# Patient Record
Sex: MALE | Race: BLACK OR AFRICAN AMERICAN | NOT HISPANIC OR LATINO | URBAN - METROPOLITAN AREA
[De-identification: names, ages, dates, MRNs, and addresses within clinical notes are randomized per-mention and may not be internally consistent; named-entity substitution may affect disease eponyms.]

---

## 2020-06-15 ENCOUNTER — OFFICE VISIT (OUTPATIENT)
Dept: URBAN - METROPOLITAN AREA CLINIC 113 | Facility: CLINIC | Age: 75
End: 2020-06-15

## 2020-06-15 ENCOUNTER — OFFICE VISIT (OUTPATIENT)
Dept: URBAN - METROPOLITAN AREA CLINIC 13 | Facility: CLINIC | Age: 75
End: 2020-06-15

## 2020-06-16 ENCOUNTER — OFFICE VISIT (OUTPATIENT)
Dept: URBAN - METROPOLITAN AREA CLINIC 113 | Facility: CLINIC | Age: 75
End: 2020-06-16

## 2020-07-25 ENCOUNTER — TELEPHONE ENCOUNTER (OUTPATIENT)
Dept: URBAN - METROPOLITAN AREA CLINIC 13 | Facility: CLINIC | Age: 75
End: 2020-07-25

## 2020-07-25 RX ORDER — NICOTINE 21 MG/24HR
APPLY 1 PATCH DAILY AS DIRECTED PATCH, TRANSDERMAL 24 HOURS TRANSDERMAL
Refills: 0 | OUTPATIENT
End: 2020-06-15

## 2020-07-25 RX ORDER — GLIPIZIDE 10 MG/1
TAKE 1 TABLET TWICE DAILY TABLET ORAL
Refills: 0 | OUTPATIENT
End: 2020-06-15

## 2020-07-25 RX ORDER — BISMUTH SUBCITRATE POTASSIUM, METRONIDAZOLE, TETRACYCLINE HYDROCHLORIDE 140; 125; 125 MG/1; MG/1; MG/1
TAKE 3 CAPSULE 4 TIMES DAILY WITH MEALS AND AT NIGHT FOR 10 DAYS CAPSULE ORAL
Qty: 120 | Refills: 0 | OUTPATIENT
Start: 2016-12-24 | End: 2017-03-03

## 2020-07-25 RX ORDER — AMLODIPINE BESYLATE 5 MG
TAKE 1 TABLET DAILY TABLET ORAL
Refills: 0 | OUTPATIENT
End: 2020-06-15

## 2020-07-25 RX ORDER — HYDROCHLOROTHIAZIDE 25 MG/1
TABLET ORAL
Refills: 0 | OUTPATIENT
End: 2016-12-15

## 2020-07-25 RX ORDER — POLYETHYLENE GLYCOL 3350, SODIUM CHLORIDE, SODIUM BICARBONATE AND POTASSIUM CHLORIDE WITH LEMON FLAVOR 420; 11.2; 5.72; 1.48 G/4L; G/4L; G/4L; G/4L
DRINK 32 OUNCES AT 5PM DAY BEFORE PROCEDURE AND 6 HOURS PRIOR POWDER, FOR SOLUTION ORAL
Qty: 1 | Refills: 0 | OUTPATIENT
Start: 2016-11-28 | End: 2016-12-15

## 2020-07-25 RX ORDER — PANTOPRAZOLE SODIUM 40 MG
TAKE 1 TABLET TWICE DAILY 30 MINUTES BEFORE BREAKFAST AND DINNER TABLET, DELAYED RELEASE (ENTERIC COATED) ORAL
Qty: 60 | Refills: 3 | OUTPATIENT
End: 2017-03-03

## 2020-07-26 ENCOUNTER — TELEPHONE ENCOUNTER (OUTPATIENT)
Dept: URBAN - METROPOLITAN AREA CLINIC 13 | Facility: CLINIC | Age: 75
End: 2020-07-26

## 2020-07-26 RX ORDER — AMOXICILLIN 500 MG/1
CAPSULE ORAL
Qty: 48 | Refills: 0 | Status: ACTIVE | COMMUNITY
Start: 2020-05-10

## 2020-07-26 RX ORDER — ASPIRIN 81 MG/1
CHEW AND SWALLOW 1 TABLET DAILY TABLET, CHEWABLE ORAL
Refills: 0 | Status: ACTIVE | COMMUNITY

## 2020-07-26 RX ORDER — SPIRONOLACTONE AND HYDROCHLOROTHIAZIDE 25; 25 MG/1; MG/1
TAKE 1 TABLET DAILY TABLET ORAL
Refills: 0 | Status: ACTIVE | COMMUNITY
Start: 2020-02-20

## 2020-07-26 RX ORDER — NAPROXEN 375 MG/1
TAKE 1 TABLET BY MOUTH TWICE A DAY AS NEEDED FOR PAIN TABLET ORAL
Qty: 20 | Refills: 0 | Status: ACTIVE | COMMUNITY
Start: 2019-10-28

## 2020-07-26 RX ORDER — TAMSULOSIN HYDROCHLORIDE 0.4 MG/1
TAKE 1 CAPSULE DAILY CAPSULE ORAL
Refills: 0 | Status: ACTIVE | COMMUNITY
Start: 2020-04-13

## 2020-07-26 RX ORDER — ASPIRIN 81 MG/1
TAKE 1 TABLET BY MOUTH EVERY DAY TABLET, COATED ORAL
Qty: 90 | Refills: 0 | Status: ACTIVE | COMMUNITY
Start: 2019-04-18

## 2020-07-26 RX ORDER — CLARITHROMYCIN 500 MG/1
TABLET, FILM COATED ORAL
Qty: 24 | Refills: 0 | Status: ACTIVE | COMMUNITY
Start: 2020-05-10

## 2020-07-26 RX ORDER — CICLOPIROX 80 MG/ML
PLEASE SEE ATTACHED FOR DETAILED DIRECTIONS SOLUTION TOPICAL
Qty: 7 | Refills: 0 | Status: ACTIVE | COMMUNITY
Start: 2019-12-05

## 2020-07-26 RX ORDER — DOXAZOSIN MESYLATE 4 MG/1
TAKE 1 TABLET DAILY TABLET ORAL
Refills: 0 | Status: ACTIVE | COMMUNITY
Start: 2020-02-20

## 2020-07-26 RX ORDER — L ACID,RHAMN/B.INFANTIS,LONGUM 2B CELL
TAKE 1 TABLET BY MOUTH EVERY DAY CAPSULE, SPRINKLE ORAL
Qty: 90 | Refills: 0 | Status: ACTIVE | COMMUNITY
Start: 2019-06-17

## 2020-07-26 RX ORDER — GLIPIZIDE 10 MG/1
TAKE 1 TABLET DAILY WITH BREAKFAST TABLET, FILM COATED, EXTENDED RELEASE ORAL
Refills: 0 | Status: ACTIVE | COMMUNITY
Start: 2020-04-18

## 2020-07-26 RX ORDER — ONDANSETRON 4 MG/1
TAKE 1 TABLET BY MOUTH EVERY 8 HOURS AS NEEDED FOR NAUSEA TABLET, ORALLY DISINTEGRATING ORAL
Qty: 6 | Refills: 0 | Status: ACTIVE | COMMUNITY
Start: 2019-09-03

## 2020-07-26 RX ORDER — ACYCLOVIR 400 MG/1
TAKE 2 TABLET BY MOUTH TWICE A DAY FOR 5 DAYS TABLET ORAL
Qty: 50 | Refills: 0 | Status: ACTIVE | COMMUNITY
Start: 2019-10-28

## 2020-08-18 ENCOUNTER — OFFICE VISIT (OUTPATIENT)
Dept: URBAN - METROPOLITAN AREA CLINIC 113 | Facility: CLINIC | Age: 75
End: 2020-08-18

## 2020-08-18 RX ORDER — CLARITHROMYCIN 500 MG/1
TABLET, FILM COATED ORAL
Qty: 24 | Refills: 0 | Status: ACTIVE | COMMUNITY
Start: 2020-05-10

## 2020-08-18 RX ORDER — AMOXICILLIN 500 MG/1
CAPSULE ORAL
Qty: 48 | Refills: 0 | Status: ACTIVE | COMMUNITY
Start: 2020-05-10

## 2020-08-18 RX ORDER — GLIPIZIDE 10 MG/1
TAKE 1 TABLET DAILY WITH BREAKFAST TABLET, FILM COATED, EXTENDED RELEASE ORAL
Refills: 0 | Status: ACTIVE | COMMUNITY
Start: 2020-04-18

## 2020-08-18 RX ORDER — TAMSULOSIN HYDROCHLORIDE 0.4 MG/1
TAKE 1 CAPSULE DAILY CAPSULE ORAL
Refills: 0 | Status: ACTIVE | COMMUNITY
Start: 2020-04-13

## 2020-08-18 RX ORDER — ACYCLOVIR 400 MG/1
TAKE 2 TABLET BY MOUTH TWICE A DAY FOR 5 DAYS TABLET ORAL
Qty: 50 | Refills: 0 | Status: ACTIVE | COMMUNITY
Start: 2019-10-28

## 2020-08-18 RX ORDER — SPIRONOLACTONE AND HYDROCHLOROTHIAZIDE 25; 25 MG/1; MG/1
TAKE 1 TABLET DAILY TABLET ORAL
Refills: 0 | Status: ACTIVE | COMMUNITY
Start: 2020-02-20

## 2020-08-18 RX ORDER — ASPIRIN 81 MG/1
TAKE 1 TABLET BY MOUTH EVERY DAY TABLET, COATED ORAL
Qty: 90 | Refills: 0 | Status: ACTIVE | COMMUNITY
Start: 2019-04-18

## 2020-08-18 RX ORDER — NAPROXEN 375 MG/1
TAKE 1 TABLET BY MOUTH TWICE A DAY AS NEEDED FOR PAIN TABLET ORAL
Qty: 20 | Refills: 0 | Status: ACTIVE | COMMUNITY
Start: 2019-10-28

## 2020-08-18 RX ORDER — DOXAZOSIN MESYLATE 4 MG/1
TAKE 1 TABLET DAILY TABLET ORAL
Refills: 0 | Status: ACTIVE | COMMUNITY
Start: 2020-02-20

## 2020-08-18 RX ORDER — CICLOPIROX 80 MG/ML
PLEASE SEE ATTACHED FOR DETAILED DIRECTIONS SOLUTION TOPICAL
Qty: 7 | Refills: 0 | Status: ACTIVE | COMMUNITY
Start: 2019-12-05

## 2020-08-18 RX ORDER — ONDANSETRON 4 MG/1
TAKE 1 TABLET BY MOUTH EVERY 8 HOURS AS NEEDED FOR NAUSEA TABLET, ORALLY DISINTEGRATING ORAL
Qty: 6 | Refills: 0 | Status: ACTIVE | COMMUNITY
Start: 2019-09-03

## 2020-08-18 RX ORDER — ASPIRIN 81 MG/1
CHEW AND SWALLOW 1 TABLET DAILY TABLET, CHEWABLE ORAL
Refills: 0 | Status: ACTIVE | COMMUNITY

## 2020-08-18 RX ORDER — L ACID,RHAMN/B.INFANTIS,LONGUM 2B CELL
TAKE 1 TABLET BY MOUTH EVERY DAY CAPSULE, SPRINKLE ORAL
Qty: 90 | Refills: 0 | Status: ACTIVE | COMMUNITY
Start: 2019-06-17

## 2020-08-18 NOTE — HPI-TODAY'S VISIT:
Juan Caldwell is a 75-year-old gentleman with a history of head and neck cancer status post chemoradiation presenting for 3-month follow-up to discuss EGD. He was last seen 6/16/2020 by telemedicine following recent hospitalization for upper GI bleeding with melena.  He was seen in consultation on 5/6/2020 for upper GI bleeding and melena with acute blood loss anemia.  EGD on 5/7/2020 was notable for small hiatal hernia, irregular gastroesophageal junction with widely patent Schatzki's ring (observed only), diffuse mild gastric erythema status post biopsy positive for H. pylori, nonbleeding duodenal diverticulum, nonbleeding duodenal ulcer with visible vessel and related mild to moderate stenosis status post treatment with bipolar cautery, normal third portion of the duodenum.  He was treated with Pylera.  He denied NSAID use.  He was taking Protonix 40 mg twice daily and denied any symptoms at time of last visit.  He was recommended repeat labs to assess status of his anemia, as well as iron stores.  He was recommended to decrease pantoprazole to 40 mg once daily.  Repeat EGD was to be considered to assess for ulcer healing/exclude malignancy given the complexity of the duodenal ulcer and lumen stenosis. Labs on 7/6/2020 demonstrated hemoglobin 10.6, WBC 7.4, MCV 93.5, platelets 308, iron 54, TIBC 337, percent saturation 16, ferritin 65.9

## 2024-04-09 ENCOUNTER — OV CON (OUTPATIENT)
Dept: URBAN - METROPOLITAN AREA CLINIC 113 | Facility: CLINIC | Age: 79
End: 2024-04-09

## 2024-04-29 ENCOUNTER — OV EP (OUTPATIENT)
Dept: URBAN - METROPOLITAN AREA CLINIC 113 | Facility: CLINIC | Age: 79
End: 2024-04-29
Payer: COMMERCIAL

## 2024-04-29 VITALS
WEIGHT: 183.4 LBS | SYSTOLIC BLOOD PRESSURE: 136 MMHG | TEMPERATURE: 99.7 F | DIASTOLIC BLOOD PRESSURE: 47 MMHG | HEIGHT: 71 IN | RESPIRATION RATE: 20 BRPM | HEART RATE: 79 BPM | BODY MASS INDEX: 25.68 KG/M2

## 2024-04-29 DIAGNOSIS — Z86.010 HISTORY OF ADENOMATOUS POLYP OF COLON: ICD-10-CM

## 2024-04-29 DIAGNOSIS — D64.9 ANEMIA, UNSPECIFIED TYPE: ICD-10-CM

## 2024-04-29 PROBLEM — 429047008: Status: ACTIVE | Noted: 2024-04-29

## 2024-04-29 PROCEDURE — 99244 OFF/OP CNSLTJ NEW/EST MOD 40: CPT

## 2024-04-29 RX ORDER — METFORMIN HYDROCHLORIDE 1000 MG/1
1 TABLET WITH A MEAL TABLET, FILM COATED ORAL TWICE A DAY
Status: ACTIVE | COMMUNITY

## 2024-04-29 RX ORDER — L ACID,RHAMN/B.INFANTIS,LONGUM 2B CELL
TAKE 1 TABLET BY MOUTH EVERY DAY CAPSULE, SPRINKLE ORAL
Qty: 90 | Refills: 0 | Status: ACTIVE | COMMUNITY
Start: 2019-06-17

## 2024-04-29 RX ORDER — ASPIRIN 81 MG/1
TAKE 1 TABLET BY MOUTH EVERY DAY TABLET, COATED ORAL
Qty: 90 | Refills: 0 | Status: ACTIVE | COMMUNITY
Start: 2019-04-18

## 2024-04-29 RX ORDER — TAMSULOSIN HYDROCHLORIDE 0.4 MG/1
TAKE 1 CAPSULE DAILY CAPSULE ORAL
Refills: 0 | Status: ACTIVE | COMMUNITY
Start: 2020-04-13

## 2024-04-29 RX ORDER — SPIRONOLACTONE AND HYDROCHLOROTHIAZIDE 25; 25 MG/1; MG/1
TAKE 1 TABLET DAILY TABLET ORAL
Refills: 0 | Status: ACTIVE | COMMUNITY
Start: 2020-02-20

## 2024-04-29 RX ORDER — GLIPIZIDE 10 MG/1
TAKE 1 TABLET DAILY WITH BREAKFAST TABLET, FILM COATED, EXTENDED RELEASE ORAL
Refills: 0 | Status: ACTIVE | COMMUNITY
Start: 2020-04-18

## 2024-04-29 NOTE — HPI-TODAY'S VISIT:
Mr. Caldwell is a 79-year-old gentleman with a history of hypertension, type 2 diabetes who was referred to our office by Marty wright for colon cancer screening.  A copy of today's visit will be forwarded to referring provider.  Most recent labs available for review from 2/15/2024 showed normal TSH, normal lipase, elevated glucose 258, elevated alk phos 46, GFR 51, elevated serum creatinine 1.40, elevated LDL cholesterol 111, elevated hemoglobin A1c 12.3%, low hemoglobin 11.4, low hematocrit 34.5. Today he is without any abdominal complaints. No fatigue, dysphagia, heartburn, regurgitation, unintentional weight loss, nausea, vomiting, hematemesis or melena. Bowels are moving regularly without blood per rectum. No complaints of bloating. No jaundice, icterus. He reports 2 years ago he suffered a TIA. There is no family history of colon cancer. His last colonoscopy was in 2016 and notable for a small tubular adenoma.

## 2024-04-30 LAB
HEMATOCRIT: 31.8
HEMOGLOBIN: 10.2
MCH: 29.7
MCHC: 32.1
MCV: 92.7
MPV: 9.1
PLATELET COUNT: 341
RDW: 13.1
RED BLOOD CELL COUNT: 3.43
WHITE BLOOD CELL COUNT: 7.1

## 2024-07-08 ENCOUNTER — DASHBOARD ENCOUNTERS (OUTPATIENT)
Age: 79
End: 2024-07-08

## 2024-07-08 ENCOUNTER — OFFICE VISIT (OUTPATIENT)
Dept: URBAN - METROPOLITAN AREA CLINIC 113 | Facility: CLINIC | Age: 79
End: 2024-07-08

## 2024-07-08 RX ORDER — METFORMIN HYDROCHLORIDE 1000 MG/1
1 TABLET WITH A MEAL TABLET, FILM COATED ORAL TWICE A DAY
Status: ACTIVE | COMMUNITY

## 2024-07-08 RX ORDER — L ACID,RHAMN/B.INFANTIS,LONGUM 2B CELL
TAKE 1 TABLET BY MOUTH EVERY DAY CAPSULE, SPRINKLE ORAL
Qty: 90 | Refills: 0 | Status: ACTIVE | COMMUNITY
Start: 2019-06-17

## 2024-07-08 RX ORDER — SPIRONOLACTONE AND HYDROCHLOROTHIAZIDE 25; 25 MG/1; MG/1
TAKE 1 TABLET DAILY TABLET ORAL
Refills: 0 | Status: ACTIVE | COMMUNITY
Start: 2020-02-20

## 2024-07-08 RX ORDER — ASPIRIN 81 MG/1
TAKE 1 TABLET BY MOUTH EVERY DAY TABLET, COATED ORAL
Qty: 90 | Refills: 0 | Status: ACTIVE | COMMUNITY
Start: 2019-04-18

## 2024-07-08 RX ORDER — TAMSULOSIN HYDROCHLORIDE 0.4 MG/1
TAKE 1 CAPSULE DAILY CAPSULE ORAL
Refills: 0 | Status: ACTIVE | COMMUNITY
Start: 2020-04-13

## 2024-07-08 RX ORDER — GLIPIZIDE 10 MG/1
TAKE 1 TABLET DAILY WITH BREAKFAST TABLET, FILM COATED, EXTENDED RELEASE ORAL
Refills: 0 | Status: ACTIVE | COMMUNITY
Start: 2020-04-18